# Patient Record
Sex: FEMALE | URBAN - METROPOLITAN AREA
[De-identification: names, ages, dates, MRNs, and addresses within clinical notes are randomized per-mention and may not be internally consistent; named-entity substitution may affect disease eponyms.]

---

## 2022-05-10 ENCOUNTER — APPOINTMENT (RX ONLY)
Dept: URBAN - METROPOLITAN AREA CLINIC 77 | Facility: CLINIC | Age: 52
Setting detail: DERMATOLOGY
End: 2022-05-10

## 2022-05-10 DIAGNOSIS — L50.3 DERMATOGRAPHIC URTICARIA: ICD-10-CM

## 2022-05-10 DIAGNOSIS — L21.8 OTHER SEBORRHEIC DERMATITIS: ICD-10-CM

## 2022-05-10 DIAGNOSIS — H01.13 ECZEMATOUS DERMATITIS OF EYELID: ICD-10-CM

## 2022-05-10 PROBLEM — H01.133 ECZEMATOUS DERMATITIS OF RIGHT EYE, UNSPECIFIED EYELID: Status: ACTIVE | Noted: 2022-05-10

## 2022-05-10 PROBLEM — H01.136 ECZEMATOUS DERMATITIS OF LEFT EYE, UNSPECIFIED EYELID: Status: ACTIVE | Noted: 2022-05-10

## 2022-05-10 PROCEDURE — 99203 OFFICE O/P NEW LOW 30 MIN: CPT

## 2022-05-10 PROCEDURE — ? COUNSELING

## 2022-05-10 PROCEDURE — ? TREATMENT REGIMEN

## 2022-05-10 PROCEDURE — ? PRESCRIPTION

## 2022-05-10 RX ORDER — RUXOLITINIB 15 MG/G
CREAM TOPICAL
Qty: 60 | Refills: 2 | Status: ERX | COMMUNITY
Start: 2022-05-10

## 2022-05-10 RX ORDER — HYDROCORTISONE ACETATE, IODOQUINOL 19; 10 MG/G; MG/G
CREAM TOPICAL
Qty: 30 | Refills: 2 | Status: ERX | COMMUNITY
Start: 2022-05-10

## 2022-05-10 RX ADMIN — RUXOLITINIB: 15 CREAM TOPICAL at 00:00

## 2022-05-10 RX ADMIN — HYDROCORTISONE ACETATE, IODOQUINOL: 19; 10 CREAM TOPICAL at 00:00

## 2022-05-10 ASSESSMENT — LOCATION ZONE DERM: LOCATION ZONE: EYELID

## 2022-05-10 ASSESSMENT — LOCATION DETAILED DESCRIPTION DERM
LOCATION DETAILED: RIGHT MEDIAL CANTHUS
LOCATION DETAILED: LEFT LATERAL CANTHUS
LOCATION DETAILED: LEFT MEDIAL CANTHUS
LOCATION DETAILED: RIGHT LATERAL CANTHUS

## 2022-05-10 ASSESSMENT — LOCATION SIMPLE DESCRIPTION DERM
LOCATION SIMPLE: LEFT EYELID
LOCATION SIMPLE: RIGHT EYELID

## 2022-05-10 NOTE — PROCEDURE: TREATMENT REGIMEN
Plan: Location: body\\n\\nDermatographism was drawn to detect histamine level. \\nPatient has high histamine level and advised to start taking Allegra BID, especially during allergy season.\\nF/u as needed
Detail Level: Zone
Plan: Location: face\\nPrescribe: Opzelura 1.5 % topical cream(AAA QD PRN), Vytone topical\\n\\nPt is here for a rash around her lips \\nPt states when she got the COVID VACCINE in December her skin flared up completely \\nPt mentions her skin looked much worse before todays visit and has calmed down on its own\\nDiscussed with patient that this irritation, dry scaly rash is an immune mediated condition that can be triggered or exacerbated by several factors such as lack of sleep, stress, allergies, or illness. \\nThere is also a genetic component which we discussed can be helped by taking good care of the skin with a barrier cream and avoiding harsh products.\\n\\nWill prescribe Opzelura 1.5 % topical cream(AAA QD PRN) to use in the morning , Vytone cream x 30 days to help relieve inflammation.\\nRecommend taking Allegra 180mg daily to decrease histamine level and eliminate this trigger.\\n\\n\\nF/u as 8weeks
Plan: Location: eyelids\\nPrescribe: Vytone topical\\n\\nPatient states he/she is very itchy and skin is thick and scaly.\\nPatient has treated with OTC products only. \\nDiscussed with patient this is an inflammatory condition of the skin, Normally onset with allergies or season changes, this condition is usually a genetic condition inherited by a parent. Discussed with pt that we will prescribe with Vytone cream or Elidel cream to help reduce inflammation.\\nDiscussed with pt to also use a good MC or HO such as CerVe afterward to help reestablish a healthy skin barrier.